# Patient Record
Sex: FEMALE | Race: WHITE | NOT HISPANIC OR LATINO | URBAN - METROPOLITAN AREA
[De-identification: names, ages, dates, MRNs, and addresses within clinical notes are randomized per-mention and may not be internally consistent; named-entity substitution may affect disease eponyms.]

---

## 2022-03-17 ENCOUNTER — EMERGENCY (EMERGENCY)
Facility: HOSPITAL | Age: 32
LOS: 1 days | Discharge: HOME | End: 2022-03-17
Attending: EMERGENCY MEDICINE | Admitting: EMERGENCY MEDICINE
Payer: MEDICAID

## 2022-03-17 VITALS
RESPIRATION RATE: 18 BRPM | OXYGEN SATURATION: 99 % | TEMPERATURE: 98 F | SYSTOLIC BLOOD PRESSURE: 138 MMHG | DIASTOLIC BLOOD PRESSURE: 90 MMHG | HEART RATE: 102 BPM

## 2022-03-17 PROCEDURE — 99284 EMERGENCY DEPT VISIT MOD MDM: CPT

## 2022-03-17 RX ADMIN — Medication 0.2 MILLIGRAM(S): at 20:18

## 2022-03-17 NOTE — ED PROVIDER NOTE - CONSTITUTIONAL, MLM
normal... disheveled, somnolent, alert, oriented to person, place, time/situation and in no apparent distress.

## 2022-03-17 NOTE — ED ADULT NURSE NOTE - OBJECTIVE STATEMENT
Pt presents to ED complaining of withdrawal like symptoms including temperature irregularities, malaise, generally feeling unwell. Pt endorses last heroin use at 430am this morning. Pt is accompanied by [employer] and under their custody. Handcuffed to stretcher by wrists and ankles. Appears diaphoretic and flushed on exam.

## 2022-03-17 NOTE — ED ADULT TRIAGE NOTE - CHIEF COMPLAINT QUOTE
pt BIBA for complaints of body aches and malaise. 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 in [facility] custody with officer Brina ([id number])

## 2022-03-17 NOTE — ED PROVIDER NOTE - PATIENT PORTAL LINK FT
You can access the FollowMyHealth Patient Portal offered by [facility] by registering at the following website: [email or web address]. By joining [employer]’s FollowMyHealth portal, you will also be able to view your health information using other applications (apps) compatible with our system.

## 2022-03-17 NOTE — ED PROVIDER NOTE - NSFOLLOWUPINSTRUCTIONS_ED_ALL_ED_FT
NARCOTIC WITHDRAWAL - AfterCare(R) Instructions(ER/ED)           Narcotic Withdrawal    WHAT YOU NEED TO KNOW:    Withdrawal is a response to a sudden lack of narcotics in your body. Withdrawal happens when you suddenly decrease or stop taking a narcotic you are dependent on. Dependence means you feel you need the narcotic to function mentally or physically. This happens after you have used the narcotic regularly for a long time. Withdrawal can happen with an illegal narcotic such as heroin, or a prescription narcotic such as oxycodone or fentanyl.    DISCHARGE INSTRUCTIONS:    Call your local emergency number (911 in the ), or have someone else call if:   •You have a seizure.      •You cannot be woken.      Call your doctor if:   •You have a fast heartbeat.      •You have nausea and are vomiting, or you cannot stop vomiting.      •You have the following signs and symptoms of dehydration: ?Dry eyes or mouth      ?Increased thirst      ?Dark yellow urine, or urinating little or not at all      ?Headache, dizziness, or confusion      ?Irregular or fast breathing, fast or pounding heartbeat      •You have questions or concerns about your condition or care.      Medicines: You may need any of the following:   •NSAIDs, such as ibuprofen, help decrease swelling, pain, and fever. This medicine is available with or without a doctor's order. NSAIDs can cause stomach bleeding or kidney problems in certain people. If you take blood thinner medicine, always ask your healthcare provider if NSAIDs are safe for you. Always read the medicine label and follow directions.      •Blood pressure medicine decreases symptoms of withdrawal, such as nausea, vomiting, muscle tension, and anxiety.       •Antianxiety medicine decreases anxiety and helps you feel calm and relaxed.      •Antinausea medicine helps calm your stomach and prevent vomiting.       •Take your medicine as directed. Contact your healthcare provider if you think your medicine is not helping or if you have side effects. Tell him of her if you are allergic to any medicine. Keep a list of the medicines, vitamins, and herbs you take. Include the amounts, and when and why you take them. Bring the list or the pill bottles to follow-up visits. Carry your medicine list with you in case of an emergency.      Prevent withdrawal from a prescription narcotic: The best way to prevent withdrawal is to prevent tolerance. You may need to take a different kind of pain medicine after a surgery or injury. You can also talk to your healthcare provider about ways to manage pain without medicine. If you do need to take a narcotic medicine, the following can help prevent withdrawal:   •Do not suddenly stop using the narcotic. If you have been using the narcotic for longer than 2 weeks, a sudden stop may cause dangerous side effects. Work with your healthcare provider to decrease your dose slowly.      •Take a prescribed narcotic exactly as directed. Do not take more than the recommended amount. Do not take it more often or for longer than recommended. If you use a pain patch, be sure to remove the old patch before you place a new one. Talk to your doctor or a pharmacist if you have any questions about your medicine. Narcotics often come with a Medication Guide to help you use it safely. Ask your pharmacists for a copy if you do not get one when you fill the prescription.      •Talk to your provider about signs or symptoms of a problem. Tell your provider if you think you are developing narcotic tolerance or dependence. Work with your provider to stop or lower the amount safely.      Narcotic safety:   •Do not take narcotics that belong to someone else. The kind or amount that person takes may not be right for you.      •Do not mix narcotics with other medicines or alcohol. The combination can cause an overdose, or cause you to stop breathing. Alcohol, sleeping pills, and medicines such as antihistamines can make you sleepy. A combination with narcotics can lead to a coma.      •Learn about the signs of an overdose so you know how to respond. Tell others about these signs so they will know what to do if needed. Talk to your healthcare provider about naloxone. You may be able to keep naloxone at home in case of an overdose. Your family and friends can also be trained on how to give it to you if needed.      •Keep narcotics out of the reach of children. Store narcotics in a locked cabinet or in a location that children cannot get to.      •Follow instructions for what to do with prescription narcotics you do not use. Your healthcare provider will give you instructions for how to dispose of it safely. This helps make sure no one else takes it.      Follow up with your healthcare provider as directed: You may need to return for other tests. You may also be referred to a specialty clinic to receive maintenance therapy medicine on a regular basis. Write down your questions so you remember to ask them during your visits.       © Copyright [employer] 2022           back to top                          © Copyright [employer] 2022

## 2022-03-21 DIAGNOSIS — F11.20 OPIOID DEPENDENCE, UNCOMPLICATED: ICD-10-CM

## 2024-07-26 NOTE — ED ADULT NURSE NOTE - EXTENSIONS OF SELF_ADULT
Assessment/Plan:    Ayde was seen today for migraine.    Diagnoses and all orders for this visit:    Anxiety  Significantly improved.  Controlled and at goal since starting Lexapro a couple weeks ago.  Will continue current dose of 10 mg daily.    Chronic migraine with aura without status migrainosus, not intractable  Vertigo  Control medical since starting propranolol this past week.  Advised to continue current treatment with propranolol 80 mg daily.  Possible aura symptoms of vertigo preceding migraines which patient reports have also resolved.  Advised patient that no need to pursue MRI especially given improvement with migraine medications.  No additional workup at this time.      No follow-ups on file.      Patient: Ayde Schafer is a 36 y.o.female who presents today for:   Chief Complaint   Patient presents with    Migraine         HPI:     Migraines:  Began taking propranolol 80mg daily 4 days ago and tolerating well. Last migraine a couple weeks ago.  States vertigo symptoms which usually precede headaches have also resolved. States still contemplating MRI    Anxiety:  States mood is significantly improved and has been consistently taking Lexapro 10 mg.  Attributes this to feeling better physically.    Patient's pertinent past medical history,surgical history, family history, medications, and allergies  were reviewed and updated as appropriate today.    Review of Systems         [ INSTRUCTIONS:  \"[x]\" Indicates a positive item  \"[]\" Indicates a negative item  -- DELETE ALL ITEMS NOT EXAMINED]  [x] Alert  [x] Oriented to person/place/time    [x] No apparent distress  []  Appears Unwell:     [x] Breathing appears normal  [] Appears tachypneic      [x] Rash on visible skin:none    [x] Cranial Nerves II-XII grossly intact    [x] Motor grossly intact in visible upper extremities    [x] Motor grossly intact in visible lower extremities     [x] Normal Mood  [] Anxious appearing    [] Depressed appearing     [] 
None